# Patient Record
(demographics unavailable — no encounter records)

---

## 2024-10-07 NOTE — PROCEDURE
[de-identified] : I injected the patient's left knee with (BRAND EUFLEXXA # 2) LOT# [id number] EXP: 10/23/2025 for osteoarthritis.  Knee injection visco-supplementation: The risks, benefits, convalescence and alternatives were reviewed and pt consented for injection. The possible side effects discussed included but were not limited to: pain, swelling, heat and redness. There symptoms are generally mild but if they are extensive then contact the office. Giving pain relievers by mouth such as NSAIDs or Tylenol can generally treat the reactions to injection. Rare cases of infection have been noted. Rash, hives and itching may occur post injection. If you have muscle pain or cramps, flushing and or swelling of the face, rapid heart beat, nausea, dizziness, fever, chills, headache, difficulty breathing, swelling in the arms or legs, or have a prickly feeling of your skin, contact a health care provider immediately. Following this discussion, the knee was prepped with alcohol and under sterile condition the injection was performed through a superolateral injection site with a 20 gauge needle. The needle was introduced into the joint, aspiration was performed to ensure intra-articular placement and the medication was injected. Upon withdrawal of the needle the site was cleaned with alcohol and a band aid applied. The patient tolerated the injection well and there were no adverse effects. Post injection instructions included no strenuous activity for 24 hours, cryotherapy and if there are any adverse effects to contact the office.

## 2024-10-07 NOTE — HISTORY OF PRESENT ILLNESS
[Pain Location] : pain [Stable] : stable [5] : a current pain level of 5/10 [de-identified] : This is a 63-year-old male presented for left knee pain.  He does have a known end-stage medial compartment osteoarthritis and he had gel injection few rounds with good relief. Pt received cortisone injection in June, which gave him mild relief. Pt is here for repeat Euflexxa injection #2  He is a status post right total knee arthroplasty. He states he would have left TKA after nursing home in 1 year      LIZ GAGNON presents with pain. He states the symptoms are stable. Pain levels include a current pain level of 3/10.

## 2024-10-07 NOTE — REASON FOR VISIT
[Follow-Up Visit] : a follow-up visit for [FreeTextEntry2] :  Left knee pain EUFLEXXA #2 LOT# [id number] EXP: 10/23/2025.

## 2024-10-07 NOTE — DISCUSSION/SUMMARY
[Medication Risks Reviewed] : Medication risks reviewed [Surgical risks reviewed] : Surgical risks reviewed [de-identified] : This is a 63-year-old male with end-stage medial compartment bone-on-bone osteoarthritis of the left knee his symptoms are controlled with conservative measures today he is Stable with conservative management at this time he feels relieved with cortisone and HA injection.  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 The pt opted for left knee Euflexxa injection #2 which he tolerated well. Pt will come back in 1 week for 3/3 injection.  The patient is a 63 year individual with end stage arthritis of their left knee joint. Based upon the patient's continued symptoms and failure to respond to conservative treatment , including Tylenol and anti-inflammatories, physical therapy, activity modification , local modalities such as apply ice or heat, and injections. I have recommended a total knee arthroplasty for this patient. A long discussion took place with the patient describing what a total joint replacement is and what the procedure would entail. A total knee arthroplasty model, similar to the implant that was used during the operation, was utilized to demonstrate and to discuss the various bearing surfaces of the implants. The hospitalization and post-operative care and rehabilitation were also discussed. The use of perioperative antibiotics and DVT prophylaxis were discussed. The risk, benefits and alternatives to a surgical intervention were discussed at length with the patient. The patient was also advised of risks related to the medical comorbidities, elevated body mass index (BMI), and smoking where applicable. We discussed how to reduce modifiable risk factors and encouraged smoking cessation were applicable.. A lengthy discussion took place to review the most common complications including but not limited to: deep vein thrombosis, pulmonary embolus, heart attack, stroke, infection, wound breakdown, numbness, damage to nerves, tendon, muscles, arteries or other blood vessels, death and other possible complications from anesthesia. The patient was told that we will take steps to minimize these risks by using sterile technique, antibiotics and DVT prophylaxis when appropriate and follow the patient postoperatively in the office setting to monitor progress. The possibility of recurrent pain, no improvement in pain and actual worsening of pain were also discussed with the patient.  The discharge plan of care focused on the patient going home following surgery. The patient was encouraged to make the necessary arrangements to have someone stay with them when they are discharged home. Following discharge, a home care nurse was to the patient. The home care nurse would open the patients home care case and request home physical therapy services. Home physical therapy was to commence following discharge provided it was appropriate and covered by the health insurance benefit plan.  The benefits of surgery were discussed with the patient including the potential for improving her current clinical condition through operative intervention. Alternatives to surgical intervention including continued conservative management were also discussed in detail. All questions were answered to the satisfaction of the patient. The treatment plan of care, as well as a model of a total knee arthroplasty equivalent to the one that will be used for their total joint replacement, was shared with the patient. The patient agreed to the plan of care as well as the use of implants in their total joint replacement.

## 2024-10-14 NOTE — PROCEDURE
[de-identified] : I injected the patient's left knee with (BRAND EUFLEXXA # 3) LOT# [id number] EXP: 10/23/2025 for osteoarthritis.  Knee injection visco-supplementation: The risks, benefits, convalescence and alternatives were reviewed and pt consented for injection. The possible side effects discussed included but were not limited to: pain, swelling, heat and redness. There symptoms are generally mild but if they are extensive then contact the office. Giving pain relievers by mouth such as NSAIDs or Tylenol can generally treat the reactions to injection. Rare cases of infection have been noted. Rash, hives and itching may occur post injection. If you have muscle pain or cramps, flushing and or swelling of the face, rapid heart beat, nausea, dizziness, fever, chills, headache, difficulty breathing, swelling in the arms or legs, or have a prickly feeling of your skin, contact a health care provider immediately. Following this discussion, the knee was prepped with alcohol and under sterile condition the injection was performed through a superolateral injection site with a 20 gauge needle. The needle was introduced into the joint, aspiration was performed to ensure intra-articular placement and the medication was injected. Upon withdrawal of the needle the site was cleaned with alcohol and a band aid applied. The patient tolerated the injection well and there were no adverse effects. Post injection instructions included no strenuous activity for 24 hours, cryotherapy and if there are any adverse effects to contact the office.

## 2024-10-14 NOTE — HISTORY OF PRESENT ILLNESS
[Pain Location] : pain [Stable] : stable [5] : a current pain level of 5/10 [de-identified] : This is a 63-year-old male presented for left knee pain.  He does have a known end-stage medial compartment osteoarthritis and he had gel injection few rounds with good relief. Pt received cortisone injection in June, which gave him mild relief. Pt is here for repeat Euflexxa injection #3  He is a status post right total knee arthroplasty. He states he would have left TKA after alf in 1 year      LIZ GAGNON presents with pain. He states the symptoms are stable. Pain levels include a current pain level of 3/10.

## 2024-10-14 NOTE — REASON FOR VISIT
[Follow-Up Visit] : a follow-up visit for [FreeTextEntry2] :  Left knee pain EUFLEXXA #3 LOT# [id number] EXP: 10/23/2025.

## 2024-10-14 NOTE — DISCUSSION/SUMMARY
[Medication Risks Reviewed] : Medication risks reviewed [Surgical risks reviewed] : Surgical risks reviewed [de-identified] : This is a 63-year-old male with end-stage medial compartment bone-on-bone osteoarthritis of the left knee his symptoms are controlled with conservative measures today he is Stable with conservative management at this time he feels relieved with cortisone and HA injection.  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 The pt opted for left knee Euflexxa injection #3 which he tolerated well. Pt will come back in 6 months for re-evaluation.  The patient is a 63 year individual with end stage arthritis of their left knee joint. Based upon the patient's continued symptoms and failure to respond to conservative treatment , including Tylenol and anti-inflammatories, physical therapy, activity modification , local modalities such as apply ice or heat, and injections. I have recommended a total knee arthroplasty for this patient. A long discussion took place with the patient describing what a total joint replacement is and what the procedure would entail. A total knee arthroplasty model, similar to the implant that was used during the operation, was utilized to demonstrate and to discuss the various bearing surfaces of the implants. The hospitalization and post-operative care and rehabilitation were also discussed. The use of perioperative antibiotics and DVT prophylaxis were discussed. The risk, benefits and alternatives to a surgical intervention were discussed at length with the patient. The patient was also advised of risks related to the medical comorbidities, elevated body mass index (BMI), and smoking where applicable. We discussed how to reduce modifiable risk factors and encouraged smoking cessation were applicable.. A lengthy discussion took place to review the most common complications including but not limited to: deep vein thrombosis, pulmonary embolus, heart attack, stroke, infection, wound breakdown, numbness, damage to nerves, tendon, muscles, arteries or other blood vessels, death and other possible complications from anesthesia. The patient was told that we will take steps to minimize these risks by using sterile technique, antibiotics and DVT prophylaxis when appropriate and follow the patient postoperatively in the office setting to monitor progress. The possibility of recurrent pain, no improvement in pain and actual worsening of pain were also discussed with the patient.  The discharge plan of care focused on the patient going home following surgery. The patient was encouraged to make the necessary arrangements to have someone stay with them when they are discharged home. Following discharge, a home care nurse was to the patient. The home care nurse would open the patients home care case and request home physical therapy services. Home physical therapy was to commence following discharge provided it was appropriate and covered by the health insurance benefit plan.  The benefits of surgery were discussed with the patient including the potential for improving her current clinical condition through operative intervention. Alternatives to surgical intervention including continued conservative management were also discussed in detail. All questions were answered to the satisfaction of the patient. The treatment plan of care, as well as a model of a total knee arthroplasty equivalent to the one that will be used for their total joint replacement, was shared with the patient. The patient agreed to the plan of care as well as the use of implants in their total joint replacement.

## 2024-12-11 NOTE — END OF VISIT
[Time Spent: ___ minutes] : I have spent [unfilled] minutes of time on the encounter which excludes teaching and separately reported services. [TextEntry] : Discussed with pt at length regarding RAJANI, obesity, soboe, remote smoking hx, prior Covid infection; reviewed w/u with pt as above.

## 2024-12-11 NOTE — DISCUSSION/SUMMARY
[FreeTextEntry1] :  #1. [place] on 6/10/24 was normal. #2. The patient does not appear to require chronic BD therapy at this time. #3. Diet and exercise for weight loss. #4. SOBOE is likely at least somewhat related to weight or deconditioning.  #5. CXR to evaluate SOBOE was clear on 2/9/24. Will repeat CXR and obtain sinus xrays given nasal congestion and PNDS which is preventing him from being compliant with PAP therapy. #6. Continue APAP therapy for moderate RAJANI with an AHI of 16.1 though worse in REM sleep and severe in supine position. The patient is using and benefitting from CPAP therapy but now with reduced compliance. He did not tolerate FFM and is back to nasal mask.  #7. Replace PAP equipment as needed; ordered 2/6/24. Gave pt [employer]wear FFM (med) to try to improve compliance but he did not like. #8. S/p Covid vaccines and boosters. #9. F/u in 2 months with compliance and xrays, and repeat elan. Encouraged improved compliance. #10. Consider repeat HST with weight loss given borderline moderate degree of RAJANI on his initial study. #11. Encouraged cigar smoking cessation. Pt is a former cigarette smoker but quit in 1984. #12. Prednisone taper for cough and nasal complaints.  The patient expressed understanding and agreement with the above recommendations/plan and accepts responsibility to be compliant with recommended testing, therapies, and f/u visits. All relevant questions and concerns were addressed.

## 2024-12-11 NOTE — REVIEW OF SYSTEMS
[Nasal Congestion] : nasal congestion [SOB on Exertion] : sob on exertion [Back Pain] : back pain [Dizziness] : dizziness [Obesity] : obesity [Negative] : Psychiatric

## 2024-12-11 NOTE — CONSULT LETTER
[Dear  ___] : Dear  [unfilled], [Consult Letter:] : I had the pleasure of evaluating your patient, [unfilled]. [Please see my note below.] : Please see my note below. [Consult Closing:] : Thank you very much for allowing me to participate in the care of this patient.  If you have any questions, please do not hesitate to contact me. [Sincerely,] : Sincerely, [FreeTextEntry3] : Vik Hendrix MD, FCCP, D. [facility] [place]

## 2024-12-11 NOTE — REASON FOR VISIT
[Follow-Up] : a follow-up visit [Sleep Apnea] : sleep apnea [Shortness of Breath] : shortness of breath [Obesity] : obesity [TextBox_44] : Prior Covid infection, remote smoking hx

## 2024-12-11 NOTE — HISTORY OF PRESENT ILLNESS
[None] : No associated symptoms are reported [Good Compliance] : good compliance with treatment [Good Tolerance] : good tolerance of treatment [Good Symptom Control] : good symptom control [Follow-Up - Routine Clinic] : a routine clinic follow-up of [Excess Weight] : excess weight [Currently Experiencing] : The patient is currently experiencing symptoms. [Dyspnea] : dyspnea [Knee Pain] : knee pain [Back Pain] : back pain [Joint Pain] : joint pain [Low Calorie Diet] : low calorie diet [Exercise Regimen] : exercise regimen [Fair Compliance] : fair compliance with treatment [Fair Tolerance] : fair tolerance of treatment [Fair Symptom Control] : fair symptom control [Glucose Intolerance] : glucose intolerance [Dyslipidemia] : dyslipidemia [Sleep Apnea] : sleep apnea [Hypertension] : hypertension [High] : high [Low Calorie] : low calorie [Well Balanced Diet] : well balanced meals [___ Times/Week] : exercises [unfilled] times per week [Aerobic Conditioning] : aerobic conditioning [TextBox_4] : Former smoker of 2 ppd x 5 years, quit 1984. Reports occasional cigar use (<1/day) S/p Covid infection in 2020 with mild symptoms. Has used Doxepin for sleep. Pt with nasal congestion for the past 1-2 months so no longer able to use PAP therapy. Follows with outside pulm as well. [de-identified] : APAP

## 2024-12-11 NOTE — RESULTS/DATA
[TextEntry] : Home PSG from 11/11/22 revealed moderate 16.1 with worsening in REM sleep (AHI of 28.2) and supine sleep (AHI of 55.7). The patient's overall compliance is 97% with a >4hr compliance of 83% on autoCPAP with a median and max pressure of 7.6 and 10.8 cm of water, respectively with a residual AHI of 0.9-1.2 which is normal. Pt tried FFM and now is back to nasal mask with improvement. Pt less compliant due to URI.

## 2024-12-16 NOTE — DISCUSSION/SUMMARY
[de-identified] : This is a 63-year-old male status post right total knee arthroplasty on March 25, 2021 who felt this morning and had a direct contusion to the right knee and forced flexion.  He has some swelling but no ecchymosis no visible deformity he is able to weight-bear but with pain.  his x-ray showed implants in good position no dislocation no fracture visualized.  Patient has pain and limited extension but he is able to straight leg raise I think his quad tendon is intact I instructed patient to use Tylenol and Advil and frequent icing.  Patient will stay with protected weightbearing.  I will bring him back in 2 weeks for repeat x-ray and physical exam.

## 2024-12-16 NOTE — HISTORY OF PRESENT ILLNESS
[Pain Location] : pain [Improving] : improving [___ days] : [unfilled] day(s) ago [8] : a current pain level of 8/10 [Walking] : worsened by walking [Knee Extension] : worsened with knee extension [de-identified] : This is a 63-year-old male presented with acute right knee pain after fall today he slipped and fell onto the right side he sat on his heel. He had right total knee arthroplasty done on March 25, 2021 he had extreme pain after surgery without exact identifiable cause but finally h his pain settled down  He is walking with crutches he states his pain is somewhat improving he had to take leftover oxycodone at home

## 2024-12-16 NOTE — PHYSICAL EXAM
[Antalgic] : antalgic [Crutches] : ambulates with crutches [de-identified] : GENERAL APPEARANCE: Well nourished and hydrated, pleasant, alert, and oriented x 3. Appears their stated age.  HEENT: Normocephalic, extraocular eye motion intact. Nasal septum midline. Oral cavity clear. External auditory canal clear.  RESPIRATORY: Breath sounds clear and audible in all lobes. No wheezing, No accessory muscle use. CARDIOVASCULAR: No apparent abnormalities. No lower leg edema. No varicosities. Pedal pulses are palpable. NEUROLOGIC: Sensation is normal, no muscle weakness in the upper or lower extremities. DERMATOLOGIC: No apparent skin lesions, moist, warm, no rash. SPINE: Cervical spine appears normal and moves freely; thoracic spine appears normal and moves freely; lumbosacral spine appears normal and moves freely, normal, nontender. MUSCULOSKELETAL: Hands, wrists, and elbows are normal and move freely, shoulders are normal and move freely.  Musculoskeletal 5/5 motor strength in bilateral lower extremities. Sensory: Intact in bilateral lower extremities. DTRs: Biceps, brachioradialis, triceps, patellar, ankle and plantar 2+ and symmetric bilaterally. Pulses: dorsalis pedis, posterior tibial, femoral, popliteal, and radial 2+ and symmetric bilaterally.  Constitutional: Alert and in no acute distress, but well-appearing.   [de-identified] : Right knee examination shows mild joint swelling he has pain with extension of 15 degree flexion is preserved to 100 degree he is able to weight-bear with mild pain no visible deformity or ecchymosis at this time.  He is able to straight leg raise  [de-identified] :  3V xray of the right knee done in the office today and reviewed and demonstrates s/p implants in good positioning with no evidence of wear, loosening, or subsidence.  No fracture visualized

## 2024-12-30 NOTE — DISCUSSION/SUMMARY
[de-identified] : This is a 63-year-old male status post right total knee arthroplasty on March 25, 2021 who felt this morning and had a direct contusion to the right knee and forced flexion.  His swelling has improved he is ambulating without antalgic gait but he continues to have 7 to 10 degree extension of leg.  .  his repeat x-ray showed implants in good position no dislocation no fracture visualized.  I proceeded with MRI without contrast to assess his quad tendon status.  If he has  partial tear proceed with physical therapy .  Patient will call my office when he completes the MRI.   we will discuss next plan of care after the result.

## 2024-12-30 NOTE — PHYSICAL EXAM
[Antalgic] : antalgic [Crutches] : ambulates with crutches [de-identified] : GENERAL APPEARANCE: Well nourished and hydrated, pleasant, alert, and oriented x 3. Appears their stated age.  HEENT: Normocephalic, extraocular eye motion intact. Nasal septum midline. Oral cavity clear. External auditory canal clear.  RESPIRATORY: Breath sounds clear and audible in all lobes. No wheezing, No accessory muscle use. CARDIOVASCULAR: No apparent abnormalities. No lower leg edema. No varicosities. Pedal pulses are palpable. NEUROLOGIC: Sensation is normal, no muscle weakness in the upper or lower extremities. DERMATOLOGIC: No apparent skin lesions, moist, warm, no rash. SPINE: Cervical spine appears normal and moves freely; thoracic spine appears normal and moves freely; lumbosacral spine appears normal and moves freely, normal, nontender. MUSCULOSKELETAL: Hands, wrists, and elbows are normal and move freely, shoulders are normal and move freely.  Musculoskeletal 5/5 motor strength in bilateral lower extremities. Sensory: Intact in bilateral lower extremities. DTRs: Biceps, brachioradialis, triceps, patellar, ankle and plantar 2+ and symmetric bilaterally. Pulses: dorsalis pedis, posterior tibial, femoral, popliteal, and radial 2+ and symmetric bilaterally.  Constitutional: Alert and in no acute distress, but well-appearing.   [de-identified] : Right knee examination shows joint effusion has improved . he has extension of 7 degree flexion is preserved to 110 degree he is able to weight-bear without pain no visible deformity or ecchymosis at this time.  He is able to straight leg raise  [de-identified] :  3V xray of the right knee done in the office today and reviewed and demonstrates s/p implants in good positioning with no evidence of wear, loosening, or subsidence.

## 2024-12-30 NOTE — HISTORY OF PRESENT ILLNESS
[Pain Location] : pain [Stable] : stable [3] : a current pain level of 3/10 [Knee Flexion] : worsened with knee flexion [de-identified] : 11/30/24) patient is here for follow-up of right knee pain after a fall he had forced deep flexion he sent onto the his heel his x-ray showed no dislocation or fracture . he has been using crutches now he is able to walk without crutches.  But he has trouble with stair climbing  12/16/24) This is a 63-year-old male presented with acute right knee pain after fall today he slipped and fell onto the right side he sat on his heel. He had right total knee arthroplasty done on March 25, 2021 he had extreme pain after surgery without exact identifiable cause but finally h his pain settled down  He is walking with crutches he states his pain is somewhat improving he had to take leftover oxycodone at home

## 2025-06-10 NOTE — RESULTS/DATA
[TextEntry] : Home PSG from 11/11/22 revealed moderate 16.1 with worsening in REM sleep (AHI of 28.2) and supine sleep (AHI of 55.7). Sinus Xray from 1/8/25 with possible b/l ethmoid sinusitis otherwise clear. CXR from 1/8/25 was clear - reviewed results and films with patient. The patient's overall compliance is 87-97% with a >4hr compliance of 80-83% on autoCPAP with a median and max pressure of 7.5-7.6 and 10.8-11 cm of water, respectively with a residual AHI of 0.9-1.4 which is normal. Pt tried FFM and now is back to nasal mask with improvement.

## 2025-06-10 NOTE — DISCUSSION/SUMMARY
[FreeTextEntry1] :  #1. [place] on 6/10/24 was normal. #2. The patient does not appear to require chronic BD therapy at this time. #3. Diet and exercise for weight loss. #4. SOBOE is likely at least somewhat related to weight or deconditioning.  #5. CXR to evaluate SOBOE was clear on 2/9/24 and again 1/8/25. Sinus xrays from 1/8/25 with mild b/l ethmoid sinusitis though clinically pt now improved and now again compliant with PAP therapy. #6. Continue APAP therapy for moderate RAJANI with an AHI of 16.1 though worse in REM sleep and severe in supine position. The patient is using and benefitting from CPAP therapy but now with reduced compliance. He did not tolerate FFM and is back to nasal mask.  #7. Replace PAP equipment as needed; ordered 6/10/25. Gave pt [employer]wear FFM (med) to try to improve compliance but he did not like. #8. S/p Covid vaccines and boosters. #9. F/u in 6-8 months with compliance. #10. Consider repeat HST with weight loss given borderline moderate degree of RAJANI on his initial study. He has lost 25 lbs with Zepbound. #11. Encouraged cigar smoking cessation. Pt is a former cigarette smoker but quit in 1984. #12. Prednisone taper for cough and nasal complaints.  The patient expressed understanding and agreement with the above recommendations/plan and accepts responsibility to be compliant with recommended testing, therapies, and f/u visits. All relevant questions and concerns were addressed.

## 2025-06-10 NOTE — REASON FOR VISIT
[Follow-Up] : a follow-up visit [Shortness of Breath] : shortness of breath [Sleep Apnea] : sleep apnea [Obesity] : obesity [TextBox_44] : Prior Covid infection, remote smoking hx

## 2025-06-10 NOTE — REVIEW OF SYSTEMS
[Nasal Congestion] : nasal congestion [SOB on Exertion] : sob on exertion [Back Pain] : back pain [Dizziness] : dizziness [Obesity] : obesity [Negative] : Hematologic

## 2025-06-10 NOTE — PHYSICAL EXAM
[No Acute Distress] : no acute distress [Normal Appearance] : normal appearance [Supple] : supple [Normal Rate/Rhythm] : normal rate/rhythm [Normal S1, S2] : normal s1, s2 [No Murmurs] : no murmurs [No Resp Distress] : no resp distress [No Acc Muscle Use] : no acc muscle use [Normal Rhythm and Effort] : normal rhythm and effort [No Abnormalities] : no abnormalities [Clear to Auscultation Bilaterally] : clear to auscultation bilaterally [Benign] : benign [Not Tender] : not tender [No Clubbing] : no clubbing [Soft] : soft [No Edema] : no edema [No Focal Deficits] : no focal deficits [Oriented x3] : oriented x3

## 2025-06-10 NOTE — HISTORY OF PRESENT ILLNESS
[None] : No associated symptoms are reported [Good Compliance] : good compliance with treatment [Good Tolerance] : good tolerance of treatment [Good Symptom Control] : good symptom control [Follow-Up - Routine Clinic] : a routine clinic follow-up of [Excess Weight] : excess weight [Currently Experiencing] : The patient is currently experiencing symptoms. [Dyspnea] : dyspnea [Knee Pain] : knee pain [Joint Pain] : joint pain [Back Pain] : back pain [Low Calorie Diet] : low calorie diet [Exercise Regimen] : exercise regimen [Fair Compliance] : fair compliance with treatment [Fair Tolerance] : fair tolerance of treatment [Fair Symptom Control] : fair symptom control [Glucose Intolerance] : glucose intolerance [Dyslipidemia] : dyslipidemia [Sleep Apnea] : sleep apnea [Hypertension] : hypertension [High] : high [Low Calorie] : low calorie [Well Balanced Diet] : well balanced meals [___ Times/Week] : exercises [unfilled] times per week [Aerobic Conditioning] : aerobic conditioning [TextBox_4] : Former smoker of 2 ppd x 5 years, quit 1984. Reports occasional cigar use (<1/day) S/p Covid infection in 2020 with mild symptoms. Has used Doxepin for sleep. Pt recently with sinusitis so could not use PAP therapy but now better and compliant again. Follows with outside pulm as well. On Zepbound and lost 25 lbs. [de-identified] : APAP

## 2025-06-10 NOTE — CONSULT LETTER
[Dear  ___] : Dear  [unfilled], [Consult Letter:] : I had the pleasure of evaluating your patient, [unfilled]. [Please see my note below.] : Please see my note below. [Consult Closing:] : Thank you very much for allowing me to participate in the care of this patient.  If you have any questions, please do not hesitate to contact me. [Sincerely,] : Sincerely, [FreeTextEntry3] : Vik Hendrix MD, FCCP, D. [facility] [place]

## 2025-07-29 NOTE — ASSESSMENT
[FreeTextEntry1] : CT 7/15/25- Moderate left hydronephrosis and perinephric stranding secondary to a 6 mm stone in the proximal ureter. Additional calculi in the left pelvis measuring up to 7 mm. Moderate right renal calculi. No hydronephrosis of the right kidney. Right renal cysts.  Stone is small enough for trial of spontaneous passage.   Trial of Medical expulsive therapy.   Continue Flomax 0.4mg po qhs.   Pain medications as needed.  Tylenol 1000 mg q 8 hours needed Ibuprofen 600 mg q 6 hours as needed  Increase fluids intake.   Reassess in 2-3 weeks with follow up CT stone hunt to assess for stone passage.   Ureteroscopy laser lithotripsy stent insertion if stone does not pass or if symptoms become intractable.  plan  1) kidney stone analysis  2) CT stone hunt  3) RTC to review

## 2025-07-29 NOTE — HISTORY OF PRESENT ILLNESS
[FreeTextEntry1] : 63 yr old male presents to establish care for kidney stones. Pt had left flank pain that radiated to the abdomen on 7/15/25. Diagnosed with left ureteral stone.  He had a ESWL on the left kidney on 6/23/25- brought in stones for analysis. First kidney episode was in February 2016. No family history of stones.   CT 7/15/25- Moderate left hydronephrosis and perinephric stranding secondary to a 6 mm stone in the proximal ureter. Additional calculi in the left pelvis measuring up to 7 mm. Moderate right renal calculi. No hydronephrosis of the right kidney. Right renal cysts.  Surgical hx: ESWL x4, possible ureteroscopy, Total knee replacement Medical hx: kidney stones, HTN, HLD, depression  Allergies: NKDA  Social: Alcohol- occasional, Smoking- cigar x 3 a week, Drug-none, Occupation- [employer]- no chemical exposure  Family hx: mother- metastatic breast ca, father- stomach ca  Medications: reviewed, potassium citrtae 10 MEQ bid    [None] : None